# Patient Record
Sex: FEMALE | Race: WHITE | NOT HISPANIC OR LATINO | Employment: OTHER | URBAN - METROPOLITAN AREA
[De-identification: names, ages, dates, MRNs, and addresses within clinical notes are randomized per-mention and may not be internally consistent; named-entity substitution may affect disease eponyms.]

---

## 2018-03-01 ENCOUNTER — RECORDS - HEALTHEAST (OUTPATIENT)
Dept: LAB | Facility: CLINIC | Age: 73
End: 2018-03-01

## 2018-03-01 LAB
ANION GAP SERPL CALCULATED.3IONS-SCNC: 11 MMOL/L (ref 5–18)
BUN SERPL-MCNC: 28 MG/DL (ref 8–28)
CALCIUM SERPL-MCNC: 9.5 MG/DL (ref 8.5–10.5)
CHLORIDE BLD-SCNC: 100 MMOL/L (ref 98–107)
CO2 SERPL-SCNC: 26 MMOL/L (ref 22–31)
CREAT SERPL-MCNC: 0.8 MG/DL (ref 0.6–1.1)
GFR SERPL CREATININE-BSD FRML MDRD: >60 ML/MIN/1.73M2
GLUCOSE BLD-MCNC: 95 MG/DL (ref 70–125)
POTASSIUM BLD-SCNC: 3.4 MMOL/L (ref 3.5–5)
SODIUM SERPL-SCNC: 137 MMOL/L (ref 136–145)

## 2018-08-03 ENCOUNTER — RECORDS - HEALTHEAST (OUTPATIENT)
Dept: LAB | Facility: CLINIC | Age: 73
End: 2018-08-03

## 2018-08-05 LAB — BACTERIA SPEC CULT: ABNORMAL

## 2019-02-19 ENCOUNTER — RECORDS - HEALTHEAST (OUTPATIENT)
Dept: LAB | Facility: CLINIC | Age: 74
End: 2019-02-19

## 2019-02-19 LAB
ALBUMIN SERPL-MCNC: 4 G/DL (ref 3.5–5)
ALP SERPL-CCNC: 42 U/L (ref 45–120)
ALT SERPL W P-5'-P-CCNC: 13 U/L (ref 0–45)
ANION GAP SERPL CALCULATED.3IONS-SCNC: 11 MMOL/L (ref 5–18)
AST SERPL W P-5'-P-CCNC: 20 U/L (ref 0–40)
BILIRUB SERPL-MCNC: 0.4 MG/DL (ref 0–1)
BUN SERPL-MCNC: 17 MG/DL (ref 8–28)
CALCIUM SERPL-MCNC: 9.8 MG/DL (ref 8.5–10.5)
CHLORIDE BLD-SCNC: 101 MMOL/L (ref 98–107)
CHOLEST SERPL-MCNC: 226 MG/DL
CO2 SERPL-SCNC: 25 MMOL/L (ref 22–31)
CREAT SERPL-MCNC: 0.77 MG/DL (ref 0.6–1.1)
FASTING STATUS PATIENT QL REPORTED: ABNORMAL
GFR SERPL CREATININE-BSD FRML MDRD: >60 ML/MIN/1.73M2
GLUCOSE BLD-MCNC: 114 MG/DL (ref 70–125)
HDLC SERPL-MCNC: 62 MG/DL
LDLC SERPL CALC-MCNC: 132 MG/DL
POTASSIUM BLD-SCNC: 3.7 MMOL/L (ref 3.5–5)
PROT SERPL-MCNC: 6.8 G/DL (ref 6–8)
SODIUM SERPL-SCNC: 137 MMOL/L (ref 136–145)
TRIGL SERPL-MCNC: 159 MG/DL
TSH SERPL DL<=0.005 MIU/L-ACNC: 1.97 UIU/ML (ref 0.3–5)

## 2019-02-20 LAB — 25(OH)D3 SERPL-MCNC: 72.4 NG/ML (ref 30–80)

## 2019-03-30 ENCOUNTER — RECORDS - HEALTHEAST (OUTPATIENT)
Dept: LAB | Facility: CLINIC | Age: 74
End: 2019-03-30

## 2019-04-01 LAB — BACTERIA SPEC CULT: ABNORMAL

## 2019-05-08 ENCOUNTER — RECORDS - HEALTHEAST (OUTPATIENT)
Dept: LAB | Facility: CLINIC | Age: 74
End: 2019-05-08

## 2019-05-09 LAB — B BURGDOR IGG+IGM SER QL: <0.01 INDEX VALUE

## 2019-11-29 ENCOUNTER — RECORDS - HEALTHEAST (OUTPATIENT)
Dept: LAB | Facility: CLINIC | Age: 74
End: 2019-11-29

## 2019-12-02 LAB — BACTERIA SPEC CULT: ABNORMAL

## 2020-02-10 ENCOUNTER — RECORDS - HEALTHEAST (OUTPATIENT)
Dept: LAB | Facility: CLINIC | Age: 75
End: 2020-02-10

## 2020-02-10 LAB
ALBUMIN UR-MCNC: ABNORMAL G/DL
AMORPH CRY #/AREA URNS HPF: ABNORMAL /[HPF]
APPEARANCE UR: ABNORMAL
BACTERIA #/AREA URNS HPF: ABNORMAL HPF
BILIRUB UR QL STRIP: ABNORMAL
COLOR UR AUTO: ABNORMAL
GLUCOSE UR STRIP-MCNC: NEGATIVE MG/DL
HGB UR QL STRIP: ABNORMAL
KETONES UR STRIP-MCNC: ABNORMAL MG/DL
LEUKOCYTE ESTERASE UR QL STRIP: ABNORMAL
NITRATE UR QL: ABNORMAL
PH UR STRIP: 5.5 [PH] (ref 4.5–8)
RBC #/AREA URNS AUTO: ABNORMAL HPF
SP GR UR STRIP: 1.01 (ref 1–1.03)
SQUAMOUS #/AREA URNS AUTO: ABNORMAL LPF
TRANS CELLS #/AREA URNS HPF: ABNORMAL LPF
UROBILINOGEN UR STRIP-ACNC: ABNORMAL
WBC #/AREA URNS AUTO: >100 HPF

## 2020-02-12 LAB — BACTERIA SPEC CULT: ABNORMAL

## 2020-03-17 ENCOUNTER — RECORDS - HEALTHEAST (OUTPATIENT)
Dept: LAB | Facility: CLINIC | Age: 75
End: 2020-03-17

## 2020-03-19 LAB — BACTERIA SPEC CULT: NORMAL

## 2020-06-10 ENCOUNTER — HOME CARE/HOSPICE - HEALTHEAST (OUTPATIENT)
Dept: HOME HEALTH SERVICES | Facility: HOME HEALTH | Age: 75
End: 2020-06-10

## 2020-07-01 ENCOUNTER — ALLIED HEALTH/NURSE VISIT (OUTPATIENT)
Dept: PHARMACY | Facility: PHYSICIAN GROUP | Age: 75
End: 2020-07-01
Payer: COMMERCIAL

## 2020-07-01 DIAGNOSIS — M81.0 OSTEOPOROSIS, UNSPECIFIED OSTEOPOROSIS TYPE, UNSPECIFIED PATHOLOGICAL FRACTURE PRESENCE: ICD-10-CM

## 2020-07-01 DIAGNOSIS — I10 ESSENTIAL HYPERTENSION: ICD-10-CM

## 2020-07-01 DIAGNOSIS — E78.2 MIXED HYPERLIPIDEMIA: ICD-10-CM

## 2020-07-01 DIAGNOSIS — Z78.9 TAKES DIETARY SUPPLEMENTS: ICD-10-CM

## 2020-07-01 DIAGNOSIS — M19.90 OSTEOARTHRITIS, UNSPECIFIED OSTEOARTHRITIS TYPE, UNSPECIFIED SITE: Primary | ICD-10-CM

## 2020-07-01 DIAGNOSIS — M48.00 SPINAL STENOSIS, UNSPECIFIED SPINAL REGION: ICD-10-CM

## 2020-07-01 DIAGNOSIS — M35.3 POLYMYALGIA RHEUMATICA (H): ICD-10-CM

## 2020-07-01 DIAGNOSIS — Z79.890 HORMONE REPLACEMENT THERAPY: ICD-10-CM

## 2020-07-01 PROCEDURE — 99605 MTMS BY PHARM NP 15 MIN: CPT | Performed by: PHARMACIST

## 2020-07-01 PROCEDURE — 99607 MTMS BY PHARM ADDL 15 MIN: CPT | Performed by: PHARMACIST

## 2020-07-01 NOTE — PROGRESS NOTES
MTM Encounter  SUBJECTIVE/OBJECTIVE:                           Bri Allan is a 75 year old female called for a transitions of care visit. She was discharged from [facility] on 6/7-6/10/2020 for worsening pain 2/2 L4 stenosis with sciatica. Patient was referred by Dr. Brooks to review supplement and OTC use.    Patient consented to a telehealth visit: yes    Chief Complaint: Comprehensive review of medications and supplements.    Allergies/ADRs: Reviewed in eCW- ace inhibitors - angioedema, Cozaar - angioedema, Macrobid - stomach upset, Levaquin - swelling in mouth- may have been burn, pravastatin - muscle aches, lisinopril - angioedema    Tobacco:  reports that she has never smoked. She has never used smokeless tobacco.  Alcohol: none  Activity: works in her garden, very active   PMH: Reviewed in eCW, significant for polymyalgia rheumatica, osteoporosis, HTN, HLD, anxiety, constipation, spinal stenosis    Medication Reconciliation/Adherence/Access: Patient takes medications directly from bottles and uses pill box(es), supplements and hormones in pillbox, all others in bottles. Patient takes medications 5 time(s) per day. Patient denies any issues managing her medications, her daughter is a nurse.   Medication barriers: sometimes feeling of burden/being overwhelmed.   Patient described her current medication routine:   AM: Advil 400 mg, Airborne (vit C) 1000 mg, Vitamin C 1000 mg, hydrochlorothiazide 25 mg, Prevagen supplement, Aspirin 325 mg  Breakfast or Dinner (varies): glucosamine 1500 mg, One-a-Day, vitamin D3 5000 units (125 mcg), co-q 10 200 mg, vitamin E 400 mg, joint health supplement, estradiol 1 mg, medroxyprogesterone 2.5 mg, Hair, skin and nails, Culturelle, Azo cranberry pill, fish oil 1000 mg   Noon: Calcium citrate+D3 630+500 mg,, Azo yeast plus   Bedtime: amlodipine 5 mg   As needed: alprazolam 0.25 mg (1/4 tab)- relax pain, Advil 400 mg three times a day, Miralax       Discharge  Medications with Med changes:   START taking these medications   - acetaminophen 500 MG tablet, 500-1,000 mg, Oral, Every 4 hours PRN- not taking, doesn't work   - methylPREDNISolone 4 mg tablet, Follow package directions- completed  - naproxen 375 MG tablet, 375 mg, Oral, 2 times daily with meals- not taking, actually taking ibuprofen   - oxyCODONE 5 MG immediate release tablet, 2.5-5 mg, Oral, Every 4 hours PRN- not currently taking but keeping on hand in case pain flares up again  CHANGE how you take these medications   - polyethylene glycol 17 gram packet, 17 g, Oral, DAILY- uses as needed   CONTINUE taking these medications   - amLODIPine 5 MG tablet, 5 mg, Oral, Bedtime  - aspirin 81 mg chewable tablet, 81 mg, Oral, Bedtime- actually taking 325 mg tablet  - CALCIUM 500 ORAL, 1 tablet, Oral, DAILY, At noon- actually calcium+D see above   - estradioL 1 MG tablet, 1 mg, Oral, DAILY  - hydroCHLOROthiazide 25 MG tablet, 25 mg, Oral, DAILY  - KRILL OIL ORAL, 1 capsule, Oral, Bedtime- actually fish oil, see above  - medroxyPROGESTERone 2.5 MG tablet, 2.5 mg, Oral, DAILY  - multivitamin therapeutic tablet, 1 tablet, Oral, DAILY  - VITAMIN D3 ORAL, 1 tablet, Oral, DAILY  - VITAMIN E ORAL, 1 tablet, Oral, DAILY    Delfinra current med list:   Taking vitamin E 400 intl units capsule, Si cap(s) orally once a day    Taking MiraLax , Sig orally daily as needed    Taking oxycodone 5 mg tablet, Si/2-1 tab orally every four hours as needed           Taking - not actually using    Taking Vitamin D3  5000 intl units tablet, Si tab(s) orally once a day    Taking multivitamin Multiple Vitamins tablet, Si tab(s) orally once a day    Taking medroxyprogesterone 2.5 mg tablet, Si tab(s) orally once a day    Taking hydrochlorothiazide 25 mg tablet, Si tab(s) orally once a day    Taking Fish Oil 1000 mg capsule, Si cap(s) orally  1 times a day    Taking Estradiol 1 mg tablet, Si tab(s) orally once a  "day    Taking Culturelle Digestive Health - capsule, Si cap(s) orally once a day    Taking cranberry , Sig: daily    Taking Co-Q10  200 mg capsule, Si cap(s) orally once a day    Taking calcium citrate+D  315-250 mg tablet, Si tab(s) orally once a day    Taking aspirin  325 mg tablet, chewable, Si tab(s) chewed at once daily in morning    Taking amlodipine 5 mg tablet, Si tab(s) orally once a day Start Date: 2019    Taking alprazolam 0.25 mg tablet, Si-2 tabs orally 3 times a day as needed for anxiety    Add: Ibuprofen, Prevagen, Airborne, Vitamin C, glucosamine, Hair, Skin, and Nails, Azo yeast plus, ibuprofen    Pain 2/2 Osteoarthritis, Spinal stenosis, Polymyalgia:  Current medications include:   Ibuprofen 400 mg (2x 200 mg tab) up to 3 times a day as needed  Alprazolam 0.0625-0.25 mg up to 3 times daily as needed  Oxycodone 0.25-5 mg every 4 hours as needed- Not currently taking   She feels pain has been under good control with taking Advil (ibuprofen). Takes 400 mg when she wakes up and up to 3 times a day total. She is not taking naproxen (Aleve), isn't sure if she was right after discharge but only taking Advil now. She is not taking oxycodone since discharge but has some she is keeping on hand in case severe pain that caused her to go to hospital returns. She is also using alprazolam occasionally to \"relax pain\", only takes 1/4th of a 0.25 mg tablet and it works well. Denies feeling sleepy or off balance after taking it. Reports acetaminophen does nothing for her pain. Pain is described as Aching, Nagging and Tender.   How is your pain? Tolerable with discomfort.  Are you able to do your normal activities? Can do most things, but pain gets in the way some.  Are you able to sleep? Normal sleep. Patient reports the following side effects:  Denies Side Effects.     Hypertension: Current medications: Amlodipine 5 mg daily.    Patient does not self-monitor BP.  Patient reports no " current medication side effects.    Hyperlipidemia, ASCVD primary prevention: Current therapy includes Aspirin 325 mg daily, Fish Oil 1000 mg once daily.   Patient reports always taking higher 325 mg of aspirin and was confused when she was told to take lower 81 mg dose at discharge. She thinks she is taking to prevent a stroke. She has had issues with muscle pain from statins in the past. Patient denies any s/s of significant bruising or bleeding, specifically denies stomach pain, dyspepsia, abnormal bruises or blood in urine.     LIPID CASCADE 2/19/2019 REFERENCE RANGE   CHOLESTEROL 226  H <=199 (mg/dL)   TRIGLYCERIDES 159  H <=149 (mg/dL)   HDL CHOLESTEROL 62 >=50 (mg/dL)   LDL CHOLESTEROL CALCULATED 132  H <=129 (mg/dL)   FASTING? Unknown        Osteoporosis: Current therapy includes: estradiol 1 mg daily, calcium 630 mg/Vitamin D 500 and Vitamin D supplements: 5000 IU.   Pt is not experiencing side effects. Pt is getting the following sources of dietary calcium: cheese and green leafy vegetables  Last vitamin D level:   Vitamin D, Total (25-Hydroxy)Resulted: 2/20/2019 11:23 AM- [facility]  Component Name Value Ref Range   Vitamin D, Total (25-Hydroxy) 72.4 30 - 80 ng/mL     DEXA History: unknown  Risk factors: post-menopausal    Hormone replacement therapy: Current medications: estradiol 1 mg daily, medroxyprogesterone 2.5 mg daily  Unsure how long she has been taking these. Patient denies side effects.     Supplements/OTC: Currently taking See list above. Patient is experiencing some treatment burden but isn't sure about changing supplements. She does admit the cost can add up. Some of the supplements were recommended by her daughter who is a nurse. She takes a lot of vitamin C because she thinks it help protect her from getting COVID. Takes AZO supplements because she does think she's had less UTIs since starting them. Prevagen supplement because she heard it was helpful but this is expensive.        Today's Vitals: There were no vitals taken for this visit.- telephone encounter  Last Clinic Vitals: 3/17/2020- Ht 62, Wt 128.8, BMI 23.56, Temp Oral:98.3, Pulse 76, /78, Arm / Cuff size rt.rg    Comprehensive Metabolic Panel Resulted: 6/9/2020 6:53 AM- [facility]  Component Name Value Ref Range   Sodium 143 136 - 145 mmol/L   Potassium 3.4 (L) 3.5 - 5 mmol/L   Chloride 110 (H) 98 - 107 mmol/L   CO2 25 22 - 31 mmol/L   Anion Gap, Calculation 8 5 - 18 mmol/L   Glucose 100 70 - 125 mg/dL   BUN 16 8 - 28 mg/dL   Creatinine 0.71 0.6 - 1.1 mg/dL   GFR MDRD Af Amer >60 >60 mL/min/1.73m2   GFR MDRD Non Af Amer >60 >60 mL/min/1.73m2   Bilirubin, Total 0.4 0 - 1 mg/dL   Calcium 8.3 (L) 8.5 - 10.5 mg/dL   Protein, Total 5.6 (L) 6 - 8 g/dL   Albumin 3.4 (L) 3.5 - 5 g/dL   Alkaline Phosphatase 38 (L) 45 - 120 U/L   AST 21 0 - 40 U/L   ALT 23 0 - 45 U/L         ASSESSMENT:                          # Medication Adherence: Good, patient appears to have an understanding of her regimen but concerned about polypharmacy and treatment burden with supplements and dose frequency. Patient would benefit from ongoing PharmD education and working towards simplifying regimen.     # Pain 2/2 Osteoarthritis, Spinal stenosis, Polymyalgia:  Needs further monitoring.   Current ibuprofen use is providing adequate pain control but concerned about safety with concomitant high-dose aspirin use. Patient may benefit from changing to low-dose aspirin. May also consider switching to NSAID with safer GI profile, like meloxicam. Reviewed safety risks of combining oxycodone and alprazolam, educated patient to avoiding using together.     # Hypertension: Stable. Patient is meeting BP goal of < 140/90mmHg.     # Hyperlipidemia, ASCVD primary prevention: Needs improvement. Estimated 10-year ASCVD risk score is 27.4%, primarily driven by age. No indication for higher aspirin dose. Given concern for GI bleed with concomittant NSAID use she  would benefit from reducing aspirin dose. May also consider reassessing need for aspirin given lack of evidence to support use in patients older than 70.     # Osteoporosis: Stable.     # Hormone replacement therapy: Needs further assessment.     # Supplements/OTC: Needs improvement. Patient would benefit from simplifying supplements to reduce pill burden and avoid unnecessary costs.      PLAN:                          Post Discharge Medication Reconciliation Status: discharge medications reconciled, continue medications without change.    1. Don't take alprazolam if you take oxycodone  2. Decrease aspirin dose from 325 mg to 81 mg  3. Consider switching ibuprofen to meloxicam   4. Recommended supplement changes:   - No additional benefit of vitamin C, recommend stopping at least one of your vitamin C pills  - Stop Pravagen once current bottle gone.     PharmD Follow-up: 3 months    I spent 41 minutes with this patient today. I offer these suggestions for consideration by Dr. Brooks. A copy of the visit note was provided to the patient's primary care provider.    The patient was mailed a summary of these recommendations.     Luda Lyles, PharmD  Medication Therapy Management Pharmacist  [facility]  Pager: [phone]    ----  Telemedicine Visit Details  Type of service:  Telephone visit  Start Time: 10:15 AM   End Time: 10:56 AM  Originating Location (pt. Location): Home  Distant Location (provider location):  [facility]  Mode of Communication:  Telephone

## 2020-07-06 RX ORDER — IBUPROFEN 200 MG
400 TABLET ORAL EVERY 8 HOURS PRN
COMMUNITY

## 2020-07-06 RX ORDER — MEDROXYPROGESTERONE ACETATE 2.5 MG/1
2.5 TABLET ORAL DAILY
COMMUNITY

## 2020-07-06 RX ORDER — MULTIVIT WITH MINERALS/LUTEIN
1000 TABLET ORAL DAILY
COMMUNITY

## 2020-07-06 RX ORDER — ALPRAZOLAM 0.25 MG
.0625-.25 TABLET ORAL 3 TIMES DAILY PRN
COMMUNITY
End: 2021-09-14

## 2020-07-06 RX ORDER — ESTRADIOL 1 MG/1
1 TABLET ORAL DAILY
COMMUNITY

## 2020-07-06 RX ORDER — GLUCOSAMINE/CHONDR SU A SOD 750-600 MG
1500 TABLET ORAL DAILY
COMMUNITY

## 2020-07-06 RX ORDER — POLYETHYLENE GLYCOL 3350 17 G/17G
1 POWDER, FOR SOLUTION ORAL DAILY PRN
COMMUNITY

## 2020-07-06 RX ORDER — ASPIRIN 81 MG/1
81 TABLET ORAL DAILY
COMMUNITY

## 2020-07-06 RX ORDER — HYDROCHLOROTHIAZIDE 25 MG/1
25 TABLET ORAL EVERY MORNING
COMMUNITY
End: 2021-09-14

## 2020-07-06 RX ORDER — AMLODIPINE BESYLATE 5 MG/1
5 TABLET ORAL DAILY
COMMUNITY

## 2020-07-06 RX ORDER — CHLORAL HYDRATE 500 MG
1 CAPSULE ORAL DAILY
COMMUNITY

## 2020-07-06 RX ORDER — UBIDECARENONE 200 MG
200 CAPSULE ORAL DAILY
COMMUNITY

## 2020-07-06 RX ORDER — LACTOBACILLUS RHAMNOSUS GG 10B CELL
1 CAPSULE ORAL DAILY
COMMUNITY
End: 2021-09-14

## 2020-07-06 RX ORDER — VITAMIN E 268 MG
400 CAPSULE ORAL DAILY
COMMUNITY

## 2020-07-06 RX ORDER — OXYCODONE HYDROCHLORIDE 5 MG/1
2.5-5 TABLET ORAL EVERY 4 HOURS PRN
COMMUNITY
End: 2021-09-14

## 2020-07-06 SDOH — HEALTH STABILITY: MENTAL HEALTH: HOW OFTEN DO YOU HAVE A DRINK CONTAINING ALCOHOL?: NEVER

## 2021-02-01 ENCOUNTER — TELEPHONE (OUTPATIENT)
Dept: NURSING | Facility: CLINIC | Age: 76
End: 2021-02-01

## 2021-02-01 NOTE — TELEPHONE ENCOUNTER
Patient calling to see about getting scheduled for a covid-19 vaccine. Patient meets criteria of 75+. She is transferred to scheduling to see about an appointment.    Alessia Wilson RN  [facility]

## 2021-02-05 ENCOUNTER — AMBULATORY - HEALTHEAST (OUTPATIENT)
Dept: NURSING | Facility: CLINIC | Age: 76
End: 2021-02-05

## 2021-02-11 ENCOUNTER — RECORDS - HEALTHEAST (OUTPATIENT)
Dept: LAB | Facility: CLINIC | Age: 76
End: 2021-02-11

## 2021-02-11 LAB
ANION GAP SERPL CALCULATED.3IONS-SCNC: 10 MMOL/L (ref 5–18)
BUN SERPL-MCNC: 38 MG/DL (ref 8–28)
CALCIUM SERPL-MCNC: 10.2 MG/DL (ref 8.5–10.5)
CHLORIDE BLD-SCNC: 103 MMOL/L (ref 98–107)
CO2 SERPL-SCNC: 30 MMOL/L (ref 22–31)
CREAT SERPL-MCNC: 0.83 MG/DL (ref 0.6–1.1)
GFR SERPL CREATININE-BSD FRML MDRD: >60 ML/MIN/1.73M2
GLUCOSE BLD-MCNC: 93 MG/DL (ref 70–125)
POTASSIUM BLD-SCNC: 3.9 MMOL/L (ref 3.5–5)
SODIUM SERPL-SCNC: 143 MMOL/L (ref 136–145)

## 2021-02-26 ENCOUNTER — AMBULATORY - HEALTHEAST (OUTPATIENT)
Dept: NURSING | Facility: CLINIC | Age: 76
End: 2021-02-26

## 2021-05-26 ENCOUNTER — RECORDS - HEALTHEAST (OUTPATIENT)
Dept: ADMINISTRATIVE | Facility: CLINIC | Age: 76
End: 2021-05-26

## 2021-05-28 ENCOUNTER — RECORDS - HEALTHEAST (OUTPATIENT)
Dept: ADMINISTRATIVE | Facility: CLINIC | Age: 76
End: 2021-05-28

## 2021-05-31 ENCOUNTER — RECORDS - HEALTHEAST (OUTPATIENT)
Dept: ADMINISTRATIVE | Facility: CLINIC | Age: 76
End: 2021-05-31

## 2021-06-16 PROBLEM — M54.30 SCIATIC LEG PAIN: Status: ACTIVE | Noted: 2020-06-10

## 2021-06-16 PROBLEM — M79.661 RIGHT CALF PAIN: Status: ACTIVE | Noted: 2020-06-08

## 2021-06-16 PROBLEM — M48.061 SPINAL STENOSIS AT L4-L5 LEVEL: Status: ACTIVE | Noted: 2020-06-10

## 2021-06-16 PROBLEM — F41.1 GENERALIZED ANXIETY DISORDER: Status: ACTIVE | Noted: 2020-06-07

## 2021-06-16 PROBLEM — M79.669 CALF PAIN, UNSPECIFIED LATERALITY: Status: ACTIVE | Noted: 2020-06-08

## 2021-06-16 PROBLEM — M35.3 POLYMYALGIA RHEUMATICA (H): Status: ACTIVE | Noted: 2020-06-07

## 2021-06-16 PROBLEM — E78.2 MIXED HYPERLIPIDEMIA: Status: ACTIVE | Noted: 2020-06-07

## 2021-06-16 PROBLEM — M81.0 OSTEOPOROSIS: Status: ACTIVE | Noted: 2020-06-07

## 2021-06-16 PROBLEM — M19.071 OSTEOARTHRITIS OF JOINT OF TOE OF RIGHT FOOT: Status: ACTIVE | Noted: 2020-06-07

## 2021-07-12 ENCOUNTER — LAB REQUISITION (OUTPATIENT)
Dept: LAB | Facility: CLINIC | Age: 76
End: 2021-07-12
Payer: MEDICARE

## 2021-07-12 DIAGNOSIS — I10 ESSENTIAL (PRIMARY) HYPERTENSION: ICD-10-CM

## 2021-07-12 PROCEDURE — 36415 COLL VENOUS BLD VENIPUNCTURE: CPT | Performed by: FAMILY MEDICINE

## 2021-07-12 PROCEDURE — 80048 BASIC METABOLIC PNL TOTAL CA: CPT | Mod: ORL | Performed by: FAMILY MEDICINE

## 2021-07-12 ASSESSMENT — MIFFLIN-ST. JEOR: SCORE: 972.12

## 2021-07-13 LAB
ANION GAP SERPL CALCULATED.3IONS-SCNC: 15 MMOL/L (ref 5–18)
BUN SERPL-MCNC: 34 MG/DL (ref 8–28)
CALCIUM SERPL-MCNC: 9.9 MG/DL (ref 8.5–10.5)
CHLORIDE BLD-SCNC: 102 MMOL/L (ref 98–107)
CO2 SERPL-SCNC: 25 MMOL/L (ref 22–31)
CREAT SERPL-MCNC: 0.76 MG/DL (ref 0.6–1.1)
GFR SERPL CREATININE-BSD FRML MDRD: 75 ML/MIN/1.73M2
GLUCOSE BLD-MCNC: 167 MG/DL (ref 70–125)
POTASSIUM BLD-SCNC: 3.7 MMOL/L (ref 3.5–5)
SODIUM SERPL-SCNC: 142 MMOL/L (ref 136–145)

## 2021-09-09 ENCOUNTER — VIRTUAL VISIT (OUTPATIENT)
Dept: PHARMACY | Facility: PHYSICIAN GROUP | Age: 76
End: 2021-09-09
Payer: COMMERCIAL

## 2021-09-09 DIAGNOSIS — E78.2 MIXED HYPERLIPIDEMIA: ICD-10-CM

## 2021-09-09 DIAGNOSIS — M35.3 POLYMYALGIA RHEUMATICA (H): ICD-10-CM

## 2021-09-09 DIAGNOSIS — M48.00 SPINAL STENOSIS, UNSPECIFIED SPINAL REGION: ICD-10-CM

## 2021-09-09 DIAGNOSIS — I10 ESSENTIAL HYPERTENSION: ICD-10-CM

## 2021-09-09 DIAGNOSIS — Z79.890 HORMONE REPLACEMENT THERAPY: ICD-10-CM

## 2021-09-09 DIAGNOSIS — M19.90 OSTEOARTHRITIS, UNSPECIFIED OSTEOARTHRITIS TYPE, UNSPECIFIED SITE: Primary | ICD-10-CM

## 2021-09-09 PROCEDURE — 99607 MTMS BY PHARM ADDL 15 MIN: CPT | Performed by: PHARMACIST

## 2021-09-09 PROCEDURE — 99605 MTMS BY PHARM NP 15 MIN: CPT | Performed by: PHARMACIST

## 2021-09-09 NOTE — PROGRESS NOTES
Medication Therapy Management (MTM) Encounter    ASSESSMENT:                            Medication Adherence/Access: No issues identified    Pain 2/2 Osteoarthritis, Spinal stenosis, Polymyalgia:  Concerned about safety of taking both Anacin which contains aspirin and low-dose aspirin. She would benefit from taking one or the other rather than both on days she takes Anacin. She would also benefit from alternating ibuprofen and acetaminophen to try to minimize her use of NSAIDs.     Hypertension: Stable. Patient is meeting blood pressure goal of < 140/90mmHg.    Hyperlipidemia, ASCVD primary prevention: Stable. See comment about aspirin and Anacin noted above.     Hormone replacement therapy: Indication for continuing hormone therapy indefinitely is unclear. She may benefit from further evaluation and discussion on risks versus benefits of continuing hormone therapy.      PLAN:                            1. Only take 1/4 tablet of Anacin OR a low dose 81 mg Aspirin a day, do not take both  2. Continue to alternate using Advil and Acetaminophen for your pain  3. Make sure to drink a lot of water when you are taking Advil    Follow-up: Return in about 1 year (around 9/9/2022) for medication therapy management visit, with me, using a phone visit for annual med review.      SUBJECTIVE/OBJECTIVE:                          Bri Allan is a 76 year old female called for a follow-up visit. She was referred to me from Seth Brooks MD.  Today's visit is a follow-up MTM visit from 7/1/2020.     Reason for visit: Comprehensive medication review.    Allergies/ADRs: Reviewed in chart  Past Medical History: Reviewed in chart  Tobacco: She reports that she has never smoked. She has never used smokeless tobacco.  Alcohol: none    Medication Adherence/Access: She self-manages her medications. She takes her medications from pill bottles. She has a daily routine she follows. She will take her supplements at some point throughout  the day but typically splits them up.   Describes her current medication routine as:  AM: hydrochlorothiazide 25, prevegan, vitamin C (1000 mg), Advil 400, Anacin 1/4 tab, estradiol 1 mg, medroxyprogesterone 2.5 mg  During the day: glucosamine-chondrontin-vitamin D (Equate) 2 tabs, One-a-Day Women's 1 tablet, vitamin D3 2000 units, co-q 10 200 mg, Joint Health 1 tablet, vitamin E 180 mg, High, Skin and Nails ([place]), Azo cranberry 2, Calcium 600 mg  Midday: acetaminophen 650-1300 mg  PM: amlodipine 5 mg, Omega-3 (Equate), aspirin 81 mg     Pain 2/2 Osteoarthritis, Spinal stenosis, Polymyalgia:    Current medications include:   Ibuprofen 400 mg (2x 200 mg tab) up to 3 times a day as needed  Acetaminophen 650-1300 mg three times a day as needed   Anacin (aspirin 400 mg caffeine 32 mg) 1/4 tablet one daily as needed   She feels pain is under good control by alternating ibuprofen and acetaminophen. She will also take an Anacin 1/4 tablet in the morning as needed. She didn't think about that Anacin also has aspirin and will still take her baby aspirin if she takes Anacin.     Hypertension:   Current medications:   amlodipine 5 mg daily  hydrochlorothiazide 25 mg once daily  Patient does not self-monitor BP.  Patient reports no current medication side effects.    Hyperlipidemia, ASCVD primary prevention:   Current therapy includes Aspirin 81 mg daily, Fish Oil 1000 mg once daily.   She is taking aspirin 81 mg daily but she will also take Anacin with aspirin as needed. She thinks she is taking to prevent a stroke. She has had issues with muscle pain from statins in the past. Patient denies any s/s of significant bruising or bleeding, specifically denies stomach pain, dyspepsia, abnormal bruises or blood in urine.     Recent Labs   Lab Test 02/19/19  1044 08/07/15  1049   CHOL 226* 240*   HDL 62 73   * 137*   TRIG 159* 152*       Hormone replacement therapy:   Current medications: estradiol 1 mg daily,  "medroxyprogesterone 2.5 mg daily  Unsure how long she has been taking these but was told by a previous provider that she should never stop taking them. Patient denies side effects.       Last Clinic Vitals: 7/12/2021- /76 (BP Location: Right arm, Patient Position: Sitting, Cuff Size: Adult Regular)   Pulse 75   Ht 5' 2\" (1.575 m)   Wt 118 lb 12.8 oz (53.9 kg)   BMI 21.73 kg/m    ----------------    I spent 46 minutes with this patient today. All changes were made via collaborative practice agreement with Seth Brooks MD. A copy of the visit note was provided to the patient's primary care provider.    The patient was mailed a summary of these recommendations.     Luda Lyles, PharmD, [facility]  Medication Therapy Management Pharmacist  [facility]  Pager: [phone]      Telemedicine Visit Details  Type of service:  Telephone visit  Start Time: 1:32 PM  End Time: 2:18 PM  Originating Location (patient location): Home  Distant Location (provider location):  [facility]     Medication Therapy Recommendations  Osteoarthritis, unspecified osteoarthritis type, unspecified site    Current Medication: Aspirin-Caffeine (ANACIN) 400-32 MG TABS   Rationale: Duplicate Therapy - Unnecessary medication therapy - Indication   Recommendation: Provide Education - Only take 1/4 tablet of Anacin OR a low dose 81 mg Aspirin a day, do not take both   Status: Patient Agreed - Adherence/Education                "

## 2021-09-14 VITALS
WEIGHT: 118.8 LBS | BODY MASS INDEX: 21.86 KG/M2 | HEIGHT: 62 IN | DIASTOLIC BLOOD PRESSURE: 76 MMHG | HEART RATE: 75 BPM | SYSTOLIC BLOOD PRESSURE: 122 MMHG

## 2021-09-14 RX ORDER — SENNOSIDES 8.6 MG
650-1300 CAPSULE ORAL EVERY 8 HOURS PRN
COMMUNITY

## 2021-09-14 RX ORDER — HYDROCHLOROTHIAZIDE 25 MG/1
25 TABLET ORAL DAILY
COMMUNITY

## 2021-09-14 RX ORDER — IBUPROFEN 200 MG
1 CAPSULE ORAL DAILY
COMMUNITY

## 2021-09-14 RX ORDER — ASPRIN AND CAFFEINE 400; 32 MG/1; MG/1
0.25 TABLET ORAL DAILY PRN
COMMUNITY

## 2021-09-14 RX ORDER — ACETAMINOPHEN 160 MG
2000 TABLET,DISINTEGRATING ORAL DAILY
COMMUNITY

## 2021-09-14 NOTE — PATIENT INSTRUCTIONS
Recommendations from today's MTM visit:                                                       1. Only take 1/4 tablet of Anacin OR a low dose 81 mg Aspirin a day, do not take both  2. Continue to alternate using Advil and Acetaminophen for your pain  3. Make sure to drink a lot of water when you are taking Advil    Follow-up: Return in about 1 year (around 9/9/2022) for medication therapy management visit, with me, using a phone visit for annual med review.      It was great to speak with you today.  I value your experience and would be very thankful for your time with providing feedback on our clinic survey. You may receive a survey via email or text message in the next few days.     To schedule another MTM appointment, please call the clinic directly or you may call the MTM scheduling line at [phone] or toll-free at [phone].     My Clinical Pharmacist's contact information:                                                      Please feel free to contact me with any questions or concerns you have.      Luda Lyles, PharmD, BCACP  Medication Therapy Management Pharmacist  [facility]  Pager: [phone]

## 2022-01-27 ENCOUNTER — LAB REQUISITION (OUTPATIENT)
Dept: LAB | Facility: CLINIC | Age: 77
End: 2022-01-27
Payer: MEDICARE

## 2022-01-27 DIAGNOSIS — R30.0 DYSURIA: ICD-10-CM

## 2022-01-27 PROCEDURE — 87086 URINE CULTURE/COLONY COUNT: CPT | Mod: ORL | Performed by: FAMILY MEDICINE

## 2022-01-29 LAB
BACTERIA UR CULT: ABNORMAL
BACTERIA UR CULT: ABNORMAL

## 2022-02-08 ENCOUNTER — LAB REQUISITION (OUTPATIENT)
Dept: LAB | Facility: CLINIC | Age: 77
End: 2022-02-08
Payer: MEDICARE

## 2022-02-08 DIAGNOSIS — R30.0 DYSURIA: ICD-10-CM

## 2022-02-08 DIAGNOSIS — R25.1 TREMOR, UNSPECIFIED: ICD-10-CM

## 2022-02-08 LAB
ANION GAP SERPL CALCULATED.3IONS-SCNC: 14 MMOL/L (ref 5–18)
BUN SERPL-MCNC: 26 MG/DL (ref 8–28)
CALCIUM SERPL-MCNC: 10.5 MG/DL (ref 8.5–10.5)
CHLORIDE BLD-SCNC: 104 MMOL/L (ref 98–107)
CO2 SERPL-SCNC: 22 MMOL/L (ref 22–31)
CREAT SERPL-MCNC: 0.79 MG/DL (ref 0.6–1.1)
GFR SERPL CREATININE-BSD FRML MDRD: 76 ML/MIN/1.73M2
GLUCOSE BLD-MCNC: 84 MG/DL (ref 70–125)
MAGNESIUM SERPL-MCNC: 1.9 MG/DL (ref 1.8–2.6)
POTASSIUM BLD-SCNC: 3.9 MMOL/L (ref 3.5–5)
SODIUM SERPL-SCNC: 140 MMOL/L (ref 136–145)

## 2022-02-08 PROCEDURE — 80048 BASIC METABOLIC PNL TOTAL CA: CPT | Mod: ORL | Performed by: FAMILY MEDICINE

## 2022-02-08 PROCEDURE — 87086 URINE CULTURE/COLONY COUNT: CPT | Mod: ORL | Performed by: FAMILY MEDICINE

## 2022-02-08 PROCEDURE — 83735 ASSAY OF MAGNESIUM: CPT | Mod: ORL | Performed by: FAMILY MEDICINE

## 2022-02-09 LAB — BACTERIA UR CULT: NO GROWTH

## 2022-10-28 ENCOUNTER — LAB REQUISITION (OUTPATIENT)
Dept: LAB | Facility: CLINIC | Age: 77
End: 2022-10-28
Payer: MEDICARE

## 2022-10-28 DIAGNOSIS — I10 ESSENTIAL (PRIMARY) HYPERTENSION: ICD-10-CM

## 2022-10-28 LAB
ANION GAP SERPL CALCULATED.3IONS-SCNC: 13 MMOL/L (ref 7–15)
BUN SERPL-MCNC: 33.2 MG/DL (ref 8–23)
CALCIUM SERPL-MCNC: 9.8 MG/DL (ref 8.8–10.2)
CHLORIDE SERPL-SCNC: 98 MMOL/L (ref 98–107)
CREAT SERPL-MCNC: 0.73 MG/DL (ref 0.51–0.95)
DEPRECATED HCO3 PLAS-SCNC: 24 MMOL/L (ref 22–29)
GFR SERPL CREATININE-BSD FRML MDRD: 83 ML/MIN/1.73M2
GLUCOSE SERPL-MCNC: 140 MG/DL (ref 70–99)
POTASSIUM SERPL-SCNC: 3.1 MMOL/L (ref 3.4–5.3)
SODIUM SERPL-SCNC: 135 MMOL/L (ref 136–145)

## 2022-10-28 PROCEDURE — 80048 BASIC METABOLIC PNL TOTAL CA: CPT | Mod: ORL | Performed by: FAMILY MEDICINE

## 2023-08-07 ENCOUNTER — LAB REQUISITION (OUTPATIENT)
Dept: LAB | Facility: CLINIC | Age: 78
End: 2023-08-07
Payer: MEDICARE

## 2023-08-07 DIAGNOSIS — E87.6 HYPOKALEMIA: ICD-10-CM

## 2023-08-07 LAB
ANION GAP SERPL CALCULATED.3IONS-SCNC: 13 MMOL/L (ref 7–15)
BUN SERPL-MCNC: 24.6 MG/DL (ref 8–23)
CALCIUM SERPL-MCNC: 10 MG/DL (ref 8.8–10.2)
CHLORIDE SERPL-SCNC: 105 MMOL/L (ref 98–107)
CREAT SERPL-MCNC: 1.04 MG/DL (ref 0.51–0.95)
DEPRECATED HCO3 PLAS-SCNC: 24 MMOL/L (ref 22–29)
GFR SERPL CREATININE-BSD FRML MDRD: 54 ML/MIN/1.73M2
GLUCOSE SERPL-MCNC: 98 MG/DL (ref 70–99)
POTASSIUM SERPL-SCNC: 4.2 MMOL/L (ref 3.4–5.3)
SODIUM SERPL-SCNC: 142 MMOL/L (ref 136–145)

## 2023-08-07 PROCEDURE — 80048 BASIC METABOLIC PNL TOTAL CA: CPT | Mod: ORL | Performed by: FAMILY MEDICINE

## 2023-12-28 ENCOUNTER — LAB REQUISITION (OUTPATIENT)
Dept: LAB | Facility: CLINIC | Age: 78
End: 2023-12-28
Payer: MEDICARE

## 2023-12-28 DIAGNOSIS — R39.9 UNSPECIFIED SYMPTOMS AND SIGNS INVOLVING THE GENITOURINARY SYSTEM: ICD-10-CM

## 2023-12-28 PROCEDURE — 87086 URINE CULTURE/COLONY COUNT: CPT | Mod: ORL | Performed by: FAMILY MEDICINE

## 2023-12-28 PROCEDURE — 87186 SC STD MICRODIL/AGAR DIL: CPT | Mod: ORL | Performed by: FAMILY MEDICINE

## 2023-12-30 LAB — BACTERIA UR CULT: ABNORMAL

## 2024-01-15 ENCOUNTER — LAB REQUISITION (OUTPATIENT)
Dept: LAB | Facility: CLINIC | Age: 79
End: 2024-01-15
Payer: MEDICARE

## 2024-01-15 DIAGNOSIS — E55.9 VITAMIN D DEFICIENCY, UNSPECIFIED: ICD-10-CM

## 2024-01-15 DIAGNOSIS — R41.3 OTHER AMNESIA: ICD-10-CM

## 2024-01-15 DIAGNOSIS — E44.0 MODERATE PROTEIN-CALORIE MALNUTRITION (H): ICD-10-CM

## 2024-01-15 DIAGNOSIS — Z13.6 ENCOUNTER FOR SCREENING FOR CARDIOVASCULAR DISORDERS: ICD-10-CM

## 2024-01-15 LAB
ALBUMIN SERPL BCG-MCNC: 4.8 G/DL (ref 3.5–5.2)
ALP SERPL-CCNC: 43 U/L (ref 40–150)
ALT SERPL W P-5'-P-CCNC: 13 U/L (ref 0–50)
ANION GAP SERPL CALCULATED.3IONS-SCNC: 15 MMOL/L (ref 7–15)
AST SERPL W P-5'-P-CCNC: 23 U/L (ref 0–45)
BILIRUB SERPL-MCNC: 0.2 MG/DL
BUN SERPL-MCNC: 36.5 MG/DL (ref 8–23)
CALCIUM SERPL-MCNC: 10.5 MG/DL (ref 8.8–10.2)
CHLORIDE SERPL-SCNC: 97 MMOL/L (ref 98–107)
CHOLEST SERPL-MCNC: 276 MG/DL
CREAT SERPL-MCNC: 1.58 MG/DL (ref 0.51–0.95)
DEPRECATED HCO3 PLAS-SCNC: 25 MMOL/L (ref 22–29)
EGFRCR SERPLBLD CKD-EPI 2021: 33 ML/MIN/1.73M2
ERYTHROCYTE [DISTWIDTH] IN BLOOD BY AUTOMATED COUNT: 12.1 % (ref 10–15)
FASTING STATUS PATIENT QL REPORTED: ABNORMAL
FOLATE SERPL-MCNC: >40 NG/ML (ref 4.6–34.8)
GLUCOSE SERPL-MCNC: 113 MG/DL (ref 70–99)
HCT VFR BLD AUTO: 39 % (ref 35–47)
HDLC SERPL-MCNC: 76 MG/DL
HGB BLD-MCNC: 13.3 G/DL (ref 11.7–15.7)
LDLC SERPL CALC-MCNC: 181 MG/DL
MCH RBC QN AUTO: 30.2 PG (ref 26.5–33)
MCHC RBC AUTO-ENTMCNC: 34.1 G/DL (ref 31.5–36.5)
MCV RBC AUTO: 88 FL (ref 78–100)
NONHDLC SERPL-MCNC: 200 MG/DL
PLATELET # BLD AUTO: 353 10E3/UL (ref 150–450)
POTASSIUM SERPL-SCNC: 2.9 MMOL/L (ref 3.4–5.3)
PROT SERPL-MCNC: 6.9 G/DL (ref 6.4–8.3)
RBC # BLD AUTO: 4.41 10E6/UL (ref 3.8–5.2)
SODIUM SERPL-SCNC: 137 MMOL/L (ref 135–145)
TRIGL SERPL-MCNC: 96 MG/DL
TSH SERPL DL<=0.005 MIU/L-ACNC: 2.05 UIU/ML (ref 0.3–4.2)
WBC # BLD AUTO: 9.3 10E3/UL (ref 4–11)

## 2024-01-15 PROCEDURE — 85027 COMPLETE CBC AUTOMATED: CPT | Mod: ORL | Performed by: FAMILY MEDICINE

## 2024-01-15 PROCEDURE — 82746 ASSAY OF FOLIC ACID SERUM: CPT | Mod: ORL | Performed by: FAMILY MEDICINE

## 2024-01-15 PROCEDURE — 80053 COMPREHEN METABOLIC PANEL: CPT | Mod: ORL | Performed by: FAMILY MEDICINE

## 2024-01-15 PROCEDURE — 84443 ASSAY THYROID STIM HORMONE: CPT | Mod: ORL | Performed by: FAMILY MEDICINE

## 2024-01-15 PROCEDURE — 82306 VITAMIN D 25 HYDROXY: CPT | Mod: ORL | Performed by: FAMILY MEDICINE

## 2024-01-15 PROCEDURE — 80061 LIPID PANEL: CPT | Mod: ORL | Performed by: FAMILY MEDICINE

## 2024-01-15 PROCEDURE — 82607 VITAMIN B-12: CPT | Mod: ORL | Performed by: FAMILY MEDICINE

## 2024-01-17 LAB — VIT D+METAB SERPL-MCNC: 149 NG/ML (ref 20–50)

## 2024-01-18 LAB — VIT B12 SERPL-MCNC: >4000 PG/ML (ref 232–1245)

## 2024-02-15 ENCOUNTER — LAB REQUISITION (OUTPATIENT)
Dept: LAB | Facility: CLINIC | Age: 79
End: 2024-02-15
Payer: MEDICARE

## 2024-02-15 DIAGNOSIS — E87.8 OTHER DISORDERS OF ELECTROLYTE AND FLUID BALANCE, NOT ELSEWHERE CLASSIFIED: ICD-10-CM

## 2024-02-15 DIAGNOSIS — R39.9 UNSPECIFIED SYMPTOMS AND SIGNS INVOLVING THE GENITOURINARY SYSTEM: ICD-10-CM

## 2024-02-15 PROCEDURE — 80048 BASIC METABOLIC PNL TOTAL CA: CPT | Mod: ORL | Performed by: FAMILY MEDICINE

## 2024-02-15 PROCEDURE — 87086 URINE CULTURE/COLONY COUNT: CPT | Mod: ORL | Performed by: FAMILY MEDICINE

## 2024-02-16 LAB
ANION GAP SERPL CALCULATED.3IONS-SCNC: 16 MMOL/L (ref 7–15)
BUN SERPL-MCNC: 34.2 MG/DL (ref 8–23)
CALCIUM SERPL-MCNC: 9.7 MG/DL (ref 8.8–10.2)
CHLORIDE SERPL-SCNC: 101 MMOL/L (ref 98–107)
CREAT SERPL-MCNC: 1.86 MG/DL (ref 0.51–0.95)
DEPRECATED HCO3 PLAS-SCNC: 21 MMOL/L (ref 22–29)
EGFRCR SERPLBLD CKD-EPI 2021: 27 ML/MIN/1.73M2
GLUCOSE SERPL-MCNC: 117 MG/DL (ref 70–99)
POTASSIUM SERPL-SCNC: 3.3 MMOL/L (ref 3.4–5.3)
SODIUM SERPL-SCNC: 138 MMOL/L (ref 135–145)

## 2024-02-17 LAB — BACTERIA UR CULT: NO GROWTH
